# Patient Record
Sex: MALE | Race: WHITE | NOT HISPANIC OR LATINO | Employment: FULL TIME | URBAN - METROPOLITAN AREA
[De-identification: names, ages, dates, MRNs, and addresses within clinical notes are randomized per-mention and may not be internally consistent; named-entity substitution may affect disease eponyms.]

---

## 2018-08-02 ENCOUNTER — HOSPITAL ENCOUNTER (OUTPATIENT)
Dept: NUCLEAR MEDICINE | Facility: HOSPITAL | Age: 63
Discharge: HOME | End: 2018-08-02
Attending: INTERNAL MEDICINE | Admitting: INTERNAL MEDICINE

## 2018-09-19 ENCOUNTER — HOSPITAL ENCOUNTER (OUTPATIENT)
Dept: NUCLEAR MEDICINE | Facility: HOSPITAL | Age: 63
Discharge: HOME | End: 2018-09-19
Attending: INTERNAL MEDICINE | Admitting: INTERNAL MEDICINE

## 2018-12-11 ENCOUNTER — HOSPITAL ENCOUNTER (OUTPATIENT)
Dept: CARDIOLOGY | Facility: HOSPITAL | Age: 63
Discharge: HOME | End: 2018-12-11
Attending: NURSE PRACTITIONER | Admitting: NURSE PRACTITIONER

## 2019-02-18 ENCOUNTER — HOSPITAL ENCOUNTER (OUTPATIENT)
Dept: PAIN MEDICINE | Facility: HOSPITAL | Age: 64
Discharge: HOME | End: 2019-02-18
Attending: ANESTHESIOLOGY | Admitting: ANESTHESIOLOGY

## 2019-03-11 ENCOUNTER — HOSPITAL ENCOUNTER (OUTPATIENT)
Dept: PAIN MEDICINE | Facility: HOSPITAL | Age: 64
Discharge: HOME | End: 2019-03-11
Attending: ANESTHESIOLOGY | Admitting: ANESTHESIOLOGY

## 2019-04-08 ENCOUNTER — HOSPITAL ENCOUNTER (OUTPATIENT)
Dept: PAIN MEDICINE | Facility: HOSPITAL | Age: 64
Discharge: HOME | End: 2019-04-08
Attending: ANESTHESIOLOGY | Admitting: ANESTHESIOLOGY

## 2019-05-20 ENCOUNTER — HOSPITAL ENCOUNTER (OUTPATIENT)
Dept: PAIN MEDICINE | Facility: HOSPITAL | Age: 64
Discharge: HOME | End: 2019-05-20
Attending: ANESTHESIOLOGY | Admitting: ANESTHESIOLOGY

## 2020-07-27 ENCOUNTER — OFFICE VISIT (OUTPATIENT)
Dept: CARDIOLOGY | Facility: CLINIC | Age: 65
End: 2020-07-27

## 2020-07-27 VITALS
BODY MASS INDEX: 36.7 KG/M2 | OXYGEN SATURATION: 97 % | HEART RATE: 75 BPM | HEIGHT: 72 IN | SYSTOLIC BLOOD PRESSURE: 126 MMHG | WEIGHT: 271 LBS | DIASTOLIC BLOOD PRESSURE: 60 MMHG

## 2020-07-27 DIAGNOSIS — I10 ESSENTIAL HYPERTENSION: ICD-10-CM

## 2020-07-27 DIAGNOSIS — E78.2 MIXED HYPERLIPIDEMIA: Primary | ICD-10-CM

## 2020-07-27 PROBLEM — R07.9 CHEST PAIN: Status: ACTIVE | Noted: 2018-07-26

## 2020-07-27 PROBLEM — M54.16 LUMBAR RADICULITIS: Status: ACTIVE | Noted: 2019-02-18

## 2020-07-27 PROBLEM — M47.817 OSTEOARTHRITIS OF LUMBOSACRAL SPINE WITHOUT MYELOPATHY: Status: ACTIVE | Noted: 2019-02-18

## 2020-07-27 PROBLEM — M54.50 LOW BACK PAIN: Status: ACTIVE | Noted: 2019-02-13

## 2020-07-27 PROBLEM — M51.36 DEGENERATION OF INTERVERTEBRAL DISC OF LUMBAR REGION: Status: ACTIVE | Noted: 2019-02-18

## 2020-07-27 PROBLEM — R06.02 SHORTNESS OF BREATH: Status: ACTIVE | Noted: 2018-07-26

## 2020-07-27 PROBLEM — M48.061 SPINAL STENOSIS OF LUMBAR REGION WITHOUT NEUROGENIC CLAUDICATION: Status: ACTIVE | Noted: 2019-02-13

## 2020-07-27 PROBLEM — G89.29 CHRONIC PAIN: Status: ACTIVE | Noted: 2019-02-18

## 2020-07-27 PROBLEM — E78.5 HYPERLIPIDEMIA: Status: ACTIVE | Noted: 2018-07-26

## 2020-07-27 PROCEDURE — 93000 ELECTROCARDIOGRAM COMPLETE: CPT | Performed by: INTERNAL MEDICINE

## 2020-07-27 PROCEDURE — 99214 OFFICE O/P EST MOD 30 MIN: CPT | Performed by: INTERNAL MEDICINE

## 2020-07-27 RX ORDER — CHOLECALCIFEROL (VITAMIN D3) 50 MCG
CAPSULE ORAL DAILY
COMMUNITY
Start: 2019-02-18 | End: 2021-02-09

## 2020-07-27 RX ORDER — ASPIRIN 81 MG/1
1 TABLET ORAL DAILY
COMMUNITY
Start: 2018-07-26

## 2020-07-27 RX ORDER — MONTELUKAST SODIUM 4 MG/1
1 TABLET, CHEWABLE ORAL 2 TIMES DAILY
COMMUNITY

## 2020-07-27 RX ORDER — LISINOPRIL AND HYDROCHLOROTHIAZIDE 25; 20 MG/1; MG/1
1 TABLET ORAL EVERY 24 HOURS
COMMUNITY
Start: 2018-07-26

## 2020-07-27 RX ORDER — OMEPRAZOLE 40 MG/1
1 CAPSULE, DELAYED RELEASE ORAL DAILY
COMMUNITY
Start: 2018-07-26 | End: 2020-07-27

## 2020-07-27 RX ORDER — AMLODIPINE BESYLATE 10 MG/1
1 TABLET ORAL EVERY 24 HOURS
COMMUNITY
Start: 2018-09-05

## 2020-07-27 RX ORDER — GABAPENTIN 300 MG/1
1 CAPSULE ORAL EVERY 12 HOURS
COMMUNITY
Start: 2019-02-13

## 2020-07-27 RX ORDER — HYDRALAZINE HYDROCHLORIDE 50 MG/1
1 TABLET, FILM COATED ORAL EVERY 12 HOURS
COMMUNITY
Start: 2018-09-05

## 2020-07-27 NOTE — PROGRESS NOTES
Date of Office Visit: 2020  Encounter Provider: Dr. Fabio Cuello  Place of Service: [facility] CARDIOLOGY [place]  Patient Name: Prince Blanca  :1955  Sharon Mosley APRN    Chief Complaint   Patient presents with   • Hyperlipidemia     Consult   • Hypertension   • Shortness of Breath   • Chest Pain     History of Present Illness    I am pleased to see Mr. Blanca in my office today as a follow-up    As you know, patient is 65-year-old white gentleman whose past medical history significant for hypertension, hyperlipidemia, tobacco abuse, who came today for follow-up    In 2018, patient was presented to me for symptom of chest discomfort.  I recommended to proceed with stress test and echocardiogram.  Echocardiogram showed normal left ventricular size and function and no significant valvular heart disease.  LVEF was 60-65%..  In 2018, patient underwent stress test which was negative for ischemia.  Fixed inferior defect was noted..    Since the previous visit, patient denies any symptom of chest pain or tightness or heaviness.  Patient denies any orthopnea, PND, syncope or presyncope.  No leg edema noted.    EKG showed normal sinus rhythm.  No ST changes suggestive of ischemia.    At this stage I will continue current treatment.  Patient is advised to increase aerobic activity.  Weight loss is emphasized.  Diet modification discussed I will see the patient in 1 year    Past Medical History:   Diagnosis Date   • Benign essential HTN    • Chest pain    • Hyperlipidemia, mixed    • SOB (shortness of breath)          History reviewed. No pertinent surgical history.        Current Outpatient Medications:   •  amLODIPine (NORVASC) 10 MG tablet, 1 tablet Daily., Disp: , Rfl:   •  aspirin (Adult Aspirin EC Low Strength) 81 MG EC tablet, 1 tablet Daily., Disp: , Rfl:   •  Cholecalciferol ([employer] VITAMIN D3 PO), Take  by mouth Daily., Disp: , Rfl:   •  colestipol (COLESTID) 1 g tablet, Take 1 g by mouth 2  (Two) Times a Day., Disp: , Rfl:   •  gabapentin (NEURONTIN) 300 MG capsule, 1 capsule Every 12 (Twelve) Hours., Disp: , Rfl:   •  HM Omega-3-6-9 Fatty Acids capsule, Daily., Disp: , Rfl:   •  hydrALAZINE (APRESOLINE) 50 MG tablet, 1 tablet Every 12 (Twelve) Hours., Disp: , Rfl:   •  lisinopril-hydrochlorothiazide (PRINZIDE,ZESTORETIC) 20-25 MG per tablet, 1 tablet Daily., Disp: , Rfl:       Social History     Socioeconomic History   • Marital status:      Spouse name: Not on file   • Number of children: Not on file   • Years of education: Not on file   • Highest education level: Not on file   Tobacco Use   • Smoking status: Current Every Day Smoker     Types: Cigarettes   • Smokeless tobacco: Never Used   • Tobacco comment: none in 4 days    Substance and Sexual Activity   • Alcohol use: Not Currently   • Drug use: Never   • Sexual activity: Defer         Review of Systems   Constitution: Negative for chills and fever.   HENT: Negative for ear discharge and nosebleeds.    Eyes: Negative for discharge and redness.   Cardiovascular: Negative for chest pain, orthopnea, palpitations, paroxysmal nocturnal dyspnea and syncope.   Respiratory: Positive for shortness of breath. Negative for cough and wheezing.    Endocrine: Negative for heat intolerance.   Skin: Negative for rash.   Musculoskeletal: Positive for arthritis and joint pain. Negative for myalgias.   Gastrointestinal: Negative for abdominal pain, melena, nausea and vomiting.   Genitourinary: Negative for dysuria and hematuria.   Neurological: Negative for dizziness, light-headedness, numbness and tremors.   Psychiatric/Behavioral: Negative for depression. The patient is not nervous/anxious.        Procedures      ECG 12 Lead  Date/Time: 7/27/2020 10:13 AM  Performed by: Fabio Cuello MD  Authorized by: Fabio Cuello MD   Comparison: compared with previous ECG   Similar to previous ECG  Rhythm: sinus rhythm    Clinical impression: normal ECG            ECG  "12 Lead    (Results Pending)           Objective:    /60   Pulse 75   Ht 182.9 cm (72.01\")   Wt 123 kg (271 lb)   SpO2 97%   BMI 36.75 kg/m²         Physical Exam   Constitutional: He is oriented to person, place, and time. He appears well-developed and well-nourished.   HENT:   Head: Normocephalic and atraumatic.   Eyes: No scleral icterus.   Neck: No thyromegaly present.   Cardiovascular: Normal rate, regular rhythm and normal heart sounds. Exam reveals no gallop and no friction rub.   No murmur heard.  Pulmonary/Chest: Effort normal and breath sounds normal. No respiratory distress. He has no wheezes. He has no rales.   Abdominal: There is no tenderness.   Musculoskeletal: He exhibits no edema.   Lymphadenopathy:     He has no cervical adenopathy.   Neurological: He is alert and oriented to person, place, and time.   Skin: No rash noted. No erythema.   Psychiatric: He has a normal mood and affect.           Assessment:       Diagnosis Plan   1. Mixed hyperlipidemia  ECG 12 Lead   2. Essential hypertension  ECG 12 Lead            Plan:       At present blood pressure is very well controlled his symptom of shortness of breath are stable.  His ischemic evaluation in 2018 was unremarkable.  Echocardiogram showed normal left ventricular size and function.  Patient may have sleep apnea, patient is advised to monitor his sleep pattern.  He may need sleep studies in future  "

## 2021-02-09 ENCOUNTER — OFFICE VISIT (OUTPATIENT)
Dept: CARDIOLOGY | Facility: CLINIC | Age: 66
End: 2021-02-09

## 2021-02-09 VITALS
HEIGHT: 72 IN | SYSTOLIC BLOOD PRESSURE: 126 MMHG | WEIGHT: 219 LBS | DIASTOLIC BLOOD PRESSURE: 58 MMHG | BODY MASS INDEX: 29.66 KG/M2 | HEART RATE: 61 BPM | OXYGEN SATURATION: 98 %

## 2021-02-09 DIAGNOSIS — R42 DIZZINESS: ICD-10-CM

## 2021-02-09 DIAGNOSIS — R01.1 HEART MURMUR: ICD-10-CM

## 2021-02-09 DIAGNOSIS — R07.9 CHEST PAIN, UNSPECIFIED TYPE: Primary | ICD-10-CM

## 2021-02-09 DIAGNOSIS — R55 NEAR SYNCOPE: ICD-10-CM

## 2021-02-09 PROCEDURE — 93000 ELECTROCARDIOGRAM COMPLETE: CPT | Performed by: NURSE PRACTITIONER

## 2021-02-09 PROCEDURE — 99214 OFFICE O/P EST MOD 30 MIN: CPT | Performed by: NURSE PRACTITIONER

## 2021-02-09 NOTE — PROGRESS NOTES
Cardiology Office Follow Up Visit      Primary Care Provider:  Sharon Mosley APRN    Reason for f/u:     Chest pain  Dizziness  Near syncope      Subjective     CC:    Reports episode of chest heaviness associated with dizziness and near syncope earlier today    History of Present Illness       Prince Blanca is a 65 y.o. male.  Patient is not known to have a history of coronary artery disease.  He has previously been evaluated by Dr. Cuello with a stress Myoview and July 2018 that was negative for reversible ischemia.    Cardiovascular risk factors include family history of coronary artery disease, hypertension, dyslipidemia, tobacco abuse.    The patient reports earlier today he was at his PCPs office for scheduled follow-up when he began to have a heaviness in his chest.  It was associated with feelings of nausea, dizziness and near syncope.  It resolved spontaneously after several minutes.    The patient reports a previous episode a few months ago that was similar in nature.  He has not had episodes of exertional chest pain or dyspnea.          Past Medical History:   Diagnosis Date   • Benign essential HTN    • Chest pain    • Hyperlipidemia, mixed    • SOB (shortness of breath)        History reviewed. No pertinent surgical history.      Current Outpatient Medications:   •  amLODIPine (NORVASC) 10 MG tablet, 1 tablet Daily., Disp: , Rfl:   •  aspirin (Adult Aspirin EC Low Strength) 81 MG EC tablet, 1 tablet Daily., Disp: , Rfl:   •  colestipol (COLESTID) 1 g tablet, Take 1 g by mouth 2 (Two) Times a Day., Disp: , Rfl:   •  gabapentin (NEURONTIN) 300 MG capsule, 1 capsule Every 12 (Twelve) Hours., Disp: , Rfl:   •  hydrALAZINE (APRESOLINE) 50 MG tablet, 1 tablet Every 12 (Twelve) Hours., Disp: , Rfl:   •  lisinopril-hydrochlorothiazide (PRINZIDE,ZESTORETIC) 20-25 MG per tablet, 1 tablet Daily., Disp: , Rfl:     Social History     Socioeconomic History   • Marital status:      Spouse name: Not on file   •  "Number of children: Not on file   • Years of education: Not on file   • Highest education level: Not on file   Tobacco Use   • Smoking status: Current Every Day Smoker     Types: Cigarettes   • Smokeless tobacco: Never Used   • Tobacco comment: none in 4 days    Substance and Sexual Activity   • Alcohol use: Not Currently   • Drug use: Never   • Sexual activity: Defer       Family History   Problem Relation Age of Onset   • Heart attack Mother    • Heart disease Mother    • Hyperlipidemia Mother    • Hypertension Mother        The following portions of the patient's history were reviewed and updated as appropriate: allergies, current medications, past family history, past medical history, past social history, past surgical history and problem list.    Review of Systems   Constitution: Negative for decreased appetite and diaphoresis.   HENT: Negative for congestion, hearing loss and nosebleeds.    Cardiovascular: Positive for chest pain and near-syncope. Negative for claudication, dyspnea on exertion, irregular heartbeat, leg swelling, orthopnea, palpitations, paroxysmal nocturnal dyspnea and syncope.   Respiratory: Negative for cough, shortness of breath and sleep disturbances due to breathing.    Endocrine: Negative for polyuria.   Hematologic/Lymphatic: Does not bruise/bleed easily.   Skin: Negative for itching and rash.   Musculoskeletal: Positive for back pain. Negative for muscle weakness and myalgias.   Gastrointestinal: Negative for abdominal pain, change in bowel habit and nausea.   Genitourinary: Negative for dysuria, flank pain, frequency and hesitancy.   Neurological: Positive for dizziness. Negative for tremors and weakness.   Psychiatric/Behavioral: Negative for altered mental status. The patient does not have insomnia.      /58   Pulse 61   Ht 182.9 cm (72.01\")   Wt 99.3 kg (219 lb)   SpO2 98%   BMI 29.70 kg/m² .  Objective     Vitals signs reviewed.   Constitutional:       General: Not in " acute distress.     Appearance: Normal appearance. Well-developed.   Eyes:      Pupils: Pupils are equal, round, and reactive to light.   HENT:      Head: Normocephalic and atraumatic.   Neck:      Musculoskeletal: Normal range of motion and neck supple.      Vascular: No JVD.   Pulmonary:      Effort: Pulmonary effort is normal.      Breath sounds: Normal breath sounds.   Cardiovascular:      Normal rate. Regular rhythm.      Murmurs: There is a grade 2/4 diastolic murmur at the ULSB.   Pulses:     Intact distal pulses.   Edema:     Peripheral edema absent.   Abdominal:      General: There is no distension.      Palpations: Abdomen is soft.      Tenderness: There is no abdominal tenderness.   Musculoskeletal: Normal range of motion.   Skin:     General: Skin is warm and dry.   Neurological:      Mental Status: Alert and oriented to person, place, and time.             ECG 12 Lead    Date/Time: 2/9/2021 3:02 PM  Performed by: Fina Shaikh APRN  Authorized by: Fina Shaikh APRN   Rhythm: sinus rhythm  BPM: 61  Conduction: incomplete right bundle branch block    Clinical impression: non-specific ECG            EKG ordered by and reviewed by me in office         Diagnoses and all orders for this visit:    1. Chest pain, unspecified type (Primary)  Comments:  Associated with dizziness and near syncope  Orders:  -     ECG 12 Lead  -     Stress Test With Myocardial Perfusion (1 Day); Future  -     Adult Transthoracic Echo Complete W/ Cont if Necessary Per Protocol; Future    2. Dizziness  -     ECG 12 Lead  -     Stress Test With Myocardial Perfusion (1 Day); Future  -     Adult Transthoracic Echo Complete W/ Cont if Necessary Per Protocol; Future    3. Near syncope  -     ECG 12 Lead  -     Stress Test With Myocardial Perfusion (1 Day); Future  -     Adult Transthoracic Echo Complete W/ Cont if Necessary Per Protocol; Future    4. Heart murmur  Comments:  Grade 2/6 diastolic murmur heard best at the left  sternal border        Medical decision making:    Patient has had some episodes of chest discomfort associated with dizziness and near syncope.  He has not known to have CAD.  It has been 2-1/2 years since his last ischemic evaluation.    EKG does not show acute ST or T wave segment abnormalities.  He denies recent exertional chest pain.    The patient has chronic back pain and has required previous epidural injections for back pain.  He is uncertain of his ability to complete a Harry protocol exercise stress test due to back pain.    Additionally given his near syncope, dizziness and new diastolic murmur noted on exam would recommend proceeding with echocardiogram to assess his LV function, chamber sizes and valve function.    Patient will be scheduled to return for follow-up with Dr. Cuello in 4 weeks to review test.  If he has any new or worsening symptoms he has been advised to contact our office or go to the emergency room for symptoms are severe.          Prince Blanca  reports that he has been smoking cigarettes. He has never used smokeless tobacco.. I have educated him on the risk of diseases from using tobacco products such as cancer, COPD and heart disease.     I advised him to quit and he is not willing to quit.    I spent 3  minutes counseling the patient.

## 2021-03-02 ENCOUNTER — APPOINTMENT (OUTPATIENT)
Dept: NUCLEAR MEDICINE | Facility: HOSPITAL | Age: 66
End: 2021-03-02

## 2021-03-02 ENCOUNTER — APPOINTMENT (OUTPATIENT)
Dept: CARDIOLOGY | Facility: HOSPITAL | Age: 66
End: 2021-03-02

## 2021-03-08 ENCOUNTER — OFFICE (AMBULATORY)
Dept: URBAN - METROPOLITAN AREA CLINIC 64 | Facility: CLINIC | Age: 66
End: 2021-03-08
Payer: COMMERCIAL

## 2021-03-08 VITALS
DIASTOLIC BLOOD PRESSURE: 80 MMHG | WEIGHT: 212 LBS | SYSTOLIC BLOOD PRESSURE: 161 MMHG | HEART RATE: 57 BPM | HEIGHT: 72 IN

## 2021-03-08 DIAGNOSIS — R13.10 DYSPHAGIA, UNSPECIFIED: ICD-10-CM

## 2021-03-08 DIAGNOSIS — Z12.11 ENCOUNTER FOR SCREENING FOR MALIGNANT NEOPLASM OF COLON: ICD-10-CM

## 2021-03-08 DIAGNOSIS — R63.4 ABNORMAL WEIGHT LOSS: ICD-10-CM

## 2021-03-08 DIAGNOSIS — K21.9 GASTRO-ESOPHAGEAL REFLUX DISEASE WITHOUT ESOPHAGITIS: ICD-10-CM

## 2021-03-08 PROCEDURE — 99204 OFFICE O/P NEW MOD 45 MIN: CPT | Performed by: NURSE PRACTITIONER

## 2021-04-08 ENCOUNTER — OFFICE (AMBULATORY)
Dept: URBAN - METROPOLITAN AREA PATHOLOGY 4 | Facility: PATHOLOGY | Age: 66
End: 2021-04-08

## 2021-04-08 ENCOUNTER — ON CAMPUS - OUTPATIENT (AMBULATORY)
Dept: URBAN - METROPOLITAN AREA HOSPITAL 2 | Facility: HOSPITAL | Age: 66
End: 2021-04-08
Payer: COMMERCIAL

## 2021-04-08 ENCOUNTER — TRANSCRIBE ORDERS (OUTPATIENT)
Dept: ADMINISTRATIVE | Facility: HOSPITAL | Age: 66
End: 2021-04-08

## 2021-04-08 VITALS
SYSTOLIC BLOOD PRESSURE: 116 MMHG | HEART RATE: 63 BPM | RESPIRATION RATE: 17 BRPM | HEART RATE: 60 BPM | SYSTOLIC BLOOD PRESSURE: 134 MMHG | SYSTOLIC BLOOD PRESSURE: 170 MMHG | OXYGEN SATURATION: 99 % | SYSTOLIC BLOOD PRESSURE: 156 MMHG | HEART RATE: 71 BPM | HEART RATE: 65 BPM | SYSTOLIC BLOOD PRESSURE: 167 MMHG | HEART RATE: 67 BPM | SYSTOLIC BLOOD PRESSURE: 126 MMHG | DIASTOLIC BLOOD PRESSURE: 60 MMHG | DIASTOLIC BLOOD PRESSURE: 92 MMHG | SYSTOLIC BLOOD PRESSURE: 122 MMHG | OXYGEN SATURATION: 98 % | TEMPERATURE: 96.8 F | HEIGHT: 72 IN | HEART RATE: 77 BPM | DIASTOLIC BLOOD PRESSURE: 73 MMHG | HEART RATE: 64 BPM | RESPIRATION RATE: 18 BRPM | OXYGEN SATURATION: 97 % | DIASTOLIC BLOOD PRESSURE: 79 MMHG | SYSTOLIC BLOOD PRESSURE: 130 MMHG | SYSTOLIC BLOOD PRESSURE: 158 MMHG | DIASTOLIC BLOOD PRESSURE: 78 MMHG | HEART RATE: 72 BPM | DIASTOLIC BLOOD PRESSURE: 99 MMHG | SYSTOLIC BLOOD PRESSURE: 114 MMHG | HEART RATE: 59 BPM | SYSTOLIC BLOOD PRESSURE: 136 MMHG | HEART RATE: 66 BPM | RESPIRATION RATE: 16 BRPM | WEIGHT: 207 LBS | DIASTOLIC BLOOD PRESSURE: 70 MMHG | DIASTOLIC BLOOD PRESSURE: 55 MMHG

## 2021-04-08 DIAGNOSIS — K21.9 GASTRO-ESOPHAGEAL REFLUX DISEASE WITHOUT ESOPHAGITIS: ICD-10-CM

## 2021-04-08 DIAGNOSIS — K62.1 RECTAL POLYP: ICD-10-CM

## 2021-04-08 DIAGNOSIS — D12.2 BENIGN NEOPLASM OF ASCENDING COLON: ICD-10-CM

## 2021-04-08 DIAGNOSIS — K44.9 DIAPHRAGMATIC HERNIA WITHOUT OBSTRUCTION OR GANGRENE: ICD-10-CM

## 2021-04-08 DIAGNOSIS — R63.4 ABNORMAL WEIGHT LOSS: ICD-10-CM

## 2021-04-08 DIAGNOSIS — R13.10 DYSPHAGIA, UNSPECIFIED: ICD-10-CM

## 2021-04-08 DIAGNOSIS — Z12.11 ENCOUNTER FOR SCREENING FOR MALIGNANT NEOPLASM OF COLON: ICD-10-CM

## 2021-04-08 DIAGNOSIS — R13.10 DYSPHAGIA, UNSPECIFIED TYPE: Primary | ICD-10-CM

## 2021-04-08 DIAGNOSIS — R63.4 LOSS OF WEIGHT: ICD-10-CM

## 2021-04-08 DIAGNOSIS — K57.30 DIVERTICULOSIS OF LARGE INTESTINE WITHOUT PERFORATION OR ABS: ICD-10-CM

## 2021-04-08 DIAGNOSIS — K21.00 GASTRO-ESOPHAGEAL REFLUX DISEASE WITH ESOPHAGITIS, WITHOUT B: ICD-10-CM

## 2021-04-08 DIAGNOSIS — K21.9 CARDIOCHALASIA: ICD-10-CM

## 2021-04-08 PROBLEM — K63.5 POLYP OF COLON: Status: ACTIVE | Noted: 2021-04-08

## 2021-04-08 LAB
GI HISTOLOGY: A. UNSPECIFIED: (no result)
GI HISTOLOGY: B. UNSPECIFIED: (no result)
GI HISTOLOGY: C. UNSPECIFIED: (no result)
GI HISTOLOGY: PDF REPORT: (no result)

## 2021-04-08 PROCEDURE — 88305 TISSUE EXAM BY PATHOLOGIST: CPT | Performed by: INTERNAL MEDICINE

## 2021-04-08 PROCEDURE — 43239 EGD BIOPSY SINGLE/MULTIPLE: CPT | Performed by: INTERNAL MEDICINE

## 2021-04-08 PROCEDURE — 43450 DILATE ESOPHAGUS 1/MULT PASS: CPT | Performed by: INTERNAL MEDICINE

## 2021-04-08 PROCEDURE — 45385 COLONOSCOPY W/LESION REMOVAL: CPT | Mod: 33 | Performed by: INTERNAL MEDICINE

## 2021-04-08 RX ORDER — PANTOPRAZOLE SODIUM 40 MG/1
TABLET, DELAYED RELEASE ORAL
Qty: 90 | Refills: 5 | Status: ACTIVE

## 2021-04-08 RX ORDER — MIRTAZAPINE 30 MG/1
TABLET, ORALLY DISINTEGRATING ORAL
Qty: 90 | Refills: 5 | Status: ACTIVE
Start: 2021-04-08

## 2021-04-14 ENCOUNTER — HOSPITAL ENCOUNTER (OUTPATIENT)
Dept: CT IMAGING | Facility: HOSPITAL | Age: 66
Discharge: HOME | End: 2021-04-14
Admitting: INTERNAL MEDICINE

## 2021-04-14 DIAGNOSIS — R13.10 DYSPHAGIA, UNSPECIFIED TYPE: ICD-10-CM

## 2021-04-14 DIAGNOSIS — R63.4 LOSS OF WEIGHT: ICD-10-CM

## 2021-04-14 DIAGNOSIS — K21.9 CARDIOCHALASIA: ICD-10-CM

## 2021-04-14 LAB — CREAT BLDA-MCNC: 0.9 MG/DL (ref 0.6–1.3)

## 2021-04-14 PROCEDURE — 71260 CT THORAX DX C+: CPT

## 2021-04-14 PROCEDURE — 82565 ASSAY OF CREATININE: CPT

## 2021-04-14 PROCEDURE — 0 IOPAMIDOL PER 1 ML: Performed by: INTERNAL MEDICINE

## 2021-04-14 PROCEDURE — 74177 CT ABD & PELVIS W/CONTRAST: CPT

## 2021-04-14 RX ADMIN — IOPAMIDOL 100 ML: 755 INJECTION, SOLUTION INTRAVENOUS at 12:38

## 2021-07-22 ENCOUNTER — TRANSCRIBE ORDERS (OUTPATIENT)
Dept: ADMINISTRATIVE | Facility: HOSPITAL | Age: 66
End: 2021-07-22

## 2021-07-22 DIAGNOSIS — R06.09 OTHER FORMS OF DYSPNEA: Primary | ICD-10-CM

## 2021-07-27 ENCOUNTER — TRANSCRIBE ORDERS (OUTPATIENT)
Dept: PULMONOLOGY | Facility: HOSPITAL | Age: 66
End: 2021-07-27

## 2021-07-27 DIAGNOSIS — Z01.818 OTHER SPECIFIED PRE-OPERATIVE EXAMINATION: Primary | ICD-10-CM

## 2021-08-10 ENCOUNTER — LAB (OUTPATIENT)
Dept: LAB | Facility: HOSPITAL | Age: 66
End: 2021-08-10

## 2021-08-10 DIAGNOSIS — Z01.818 OTHER SPECIFIED PRE-OPERATIVE EXAMINATION: ICD-10-CM

## 2021-08-10 LAB — SARS-COV-2 ORF1AB RESP QL NAA+PROBE: NOT DETECTED

## 2021-08-10 PROCEDURE — U0004 COV-19 TEST NON-CDC HGH THRU: HCPCS

## 2021-08-10 PROCEDURE — C9803 HOPD COVID-19 SPEC COLLECT: HCPCS

## 2021-08-12 ENCOUNTER — HOSPITAL ENCOUNTER (OUTPATIENT)
Dept: RESPIRATORY THERAPY | Facility: HOSPITAL | Age: 66
Discharge: HOME | End: 2021-08-12
Admitting: INTERNAL MEDICINE

## 2021-08-12 DIAGNOSIS — R06.09 OTHER FORMS OF DYSPNEA: ICD-10-CM

## 2021-08-12 PROCEDURE — 94060 EVALUATION OF WHEEZING: CPT

## 2021-08-12 PROCEDURE — 94729 DIFFUSING CAPACITY: CPT

## 2021-08-12 PROCEDURE — 94726 PLETHYSMOGRAPHY LUNG VOLUMES: CPT

## 2021-08-12 RX ORDER — ALBUTEROL SULFATE 90 UG/1
2 AEROSOL, METERED RESPIRATORY (INHALATION) ONCE
Status: COMPLETED | OUTPATIENT
Start: 2021-08-12 | End: 2021-08-12

## 2021-08-12 RX ADMIN — ALBUTEROL SULFATE 2 PUFF: 108 AEROSOL, METERED RESPIRATORY (INHALATION) at 16:15

## 2023-09-01 ENCOUNTER — TRANSCRIBE ORDERS (OUTPATIENT)
Dept: ADMINISTRATIVE | Facility: HOSPITAL | Age: 68
End: 2023-09-01
Payer: COMMERCIAL

## 2023-09-01 DIAGNOSIS — F17.210 CIGARETTE NICOTINE DEPENDENCE, UNCOMPLICATED: ICD-10-CM

## 2023-09-01 DIAGNOSIS — F17.210 CIGARETTE SMOKER: Primary | ICD-10-CM
